# Patient Record
Sex: MALE | Race: WHITE | NOT HISPANIC OR LATINO
[De-identification: names, ages, dates, MRNs, and addresses within clinical notes are randomized per-mention and may not be internally consistent; named-entity substitution may affect disease eponyms.]

---

## 2019-03-05 ENCOUNTER — RECORD ABSTRACTING (OUTPATIENT)
Age: 44
End: 2019-03-05

## 2019-03-05 DIAGNOSIS — M71.10 OTHER INFECTIVE BURSITIS, UNSPECIFIED SITE: ICD-10-CM

## 2019-03-05 DIAGNOSIS — J30.2 OTHER SEASONAL ALLERGIC RHINITIS: ICD-10-CM

## 2019-11-18 ENCOUNTER — APPOINTMENT (OUTPATIENT)
Dept: INTERNAL MEDICINE | Facility: CLINIC | Age: 44
End: 2019-11-18
Payer: COMMERCIAL

## 2019-11-18 VITALS
OXYGEN SATURATION: 99 % | HEART RATE: 82 BPM | BODY MASS INDEX: 24.75 KG/M2 | SYSTOLIC BLOOD PRESSURE: 108 MMHG | TEMPERATURE: 99.6 F | HEIGHT: 69 IN | DIASTOLIC BLOOD PRESSURE: 84 MMHG | WEIGHT: 167.13 LBS | RESPIRATION RATE: 16 BRPM

## 2019-11-18 DIAGNOSIS — Z82.62 FAMILY HISTORY OF OSTEOPOROSIS: ICD-10-CM

## 2019-11-18 DIAGNOSIS — Z83.3 FAMILY HISTORY OF DIABETES MELLITUS: ICD-10-CM

## 2019-11-18 DIAGNOSIS — Z78.9 OTHER SPECIFIED HEALTH STATUS: ICD-10-CM

## 2019-11-18 PROCEDURE — 99213 OFFICE O/P EST LOW 20 MIN: CPT

## 2019-11-18 RX ORDER — VITAMIN K2 90 MCG
125 MCG CAPSULE ORAL
Refills: 0 | Status: ACTIVE | COMMUNITY

## 2019-11-18 RX ORDER — MULTIVITAMIN
TABLET ORAL
Refills: 0 | Status: ACTIVE | COMMUNITY

## 2019-11-18 RX ORDER — ASCORBIC ACID 500 MG
500 TABLET ORAL DAILY
Refills: 0 | Status: ACTIVE | COMMUNITY

## 2019-11-18 RX ORDER — BIOTIN 10 MG
TABLET ORAL DAILY
Refills: 0 | Status: ACTIVE | COMMUNITY

## 2019-11-19 NOTE — PHYSICAL EXAM
[No Acute Distress] : no acute distress [Well Nourished] : well nourished [Well Developed] : well developed [Well-Appearing] : well-appearing [Normal Sclera/Conjunctiva] : normal sclera/conjunctiva [PERRL] : pupils equal round and reactive to light [Normal Outer Ear/Nose] : the outer ears and nose were normal in appearance [EOMI] : extraocular movements intact [Normal Oropharynx] : the oropharynx was normal [No JVD] : no jugular venous distention [Normal TMs] : both tympanic membranes were normal [No Lymphadenopathy] : no lymphadenopathy [Thyroid Normal, No Nodules] : the thyroid was normal and there were no nodules present [No Respiratory Distress] : no respiratory distress  [Supple] : supple [Normal Rate] : normal rate  [No Accessory Muscle Use] : no accessory muscle use [Clear to Auscultation] : lungs were clear to auscultation bilaterally [Regular Rhythm] : with a regular rhythm [No Murmur] : no murmur heard [Normal S1, S2] : normal S1 and S2 [No Abdominal Bruit] : a ~M bruit was not heard ~T in the abdomen [No Carotid Bruits] : no carotid bruits [No Palpable Aorta] : no palpable aorta [No Edema] : there was no peripheral edema [Pedal Pulses Present] : the pedal pulses are present [Non Tender] : non-tender [No Extremity Clubbing/Cyanosis] : no extremity clubbing/cyanosis [Soft] : abdomen soft [Non-distended] : non-distended [No Masses] : no abdominal mass palpated [No HSM] : no HSM [Normal Posterior Cervical Nodes] : no posterior cervical lymphadenopathy [Normal Anterior Cervical Nodes] : no anterior cervical lymphadenopathy [Normal Bowel Sounds] : normal bowel sounds [No Spinal Tenderness] : no spinal tenderness [No Joint Swelling] : no joint swelling [No CVA Tenderness] : no CVA  tenderness [Coordination Grossly Intact] : coordination grossly intact [Grossly Normal Strength/Tone] : grossly normal strength/tone [No Rash] : no rash [No Focal Deficits] : no focal deficits [Normal Gait] : normal gait [Speech Grossly Normal] : speech grossly normal [Alert and Oriented x3] : oriented to person, place, and time [Deep Tendon Reflexes (DTR)] : deep tendon reflexes were 2+ and symmetric [Normal Affect] : the affect was normal [Normal Insight/Judgement] : insight and judgment were intact [Normal Mood] : the mood was normal [de-identified] : positive tinnel sign left wrist    [de-identified] : small bruise left forearm

## 2019-11-19 NOTE — HEALTH RISK ASSESSMENT
[Yes] : Yes [3 or 4 (1 pt)] : 3 or 4  (1 point) [Monthly or less (1 pt)] : Monthly or less (1 point) [Less than monthly (1 pt)] : Less than monthly (1 point) [No] : In the past 12 months have you used drugs other than those required for medical reasons? No [0] : 1) Little interest or pleasure doing things: Not at all (0) [No falls in past year] : Patient reported no falls in the past year [] : No [Audit-CScore] : 3 [[id number]] : 0

## 2019-11-19 NOTE — PLAN
[FreeTextEntry1] : Orthopedic  - CTS  - Advised cock up wrist brace to wear at night. Start b-complex.  If no improvement will refer to orthopedic for evaluation of CTS and EMG/NCV.  Counseling given to the pt\par \par Right forearm bruise advised supportive measure.\par \par Vitamin -D  Advised to take vitamin D3\par \par Immunization PT refused flu shot.  Education given

## 2019-11-19 NOTE — HISTORY OF PRESENT ILLNESS
[de-identified] :  A   44 year old male with a medical history as noted below present to the office for evaluation of hand numbness.  States he first noticed a few months ago but recently it has happen more often.  Patient states he wakes up in the am with mostly his left hand being numb.  Has had episode in the right hand as well.  Denies trauma to the area.  The symptoms are not present every morning.  Denies noticing any weakness of the extremity.  States symptoms are relived with re adjusting his hand position.  \par \par Also has notice a yellow discoloration on the left forearm.  Denies trauma to the area. Not sure if it is related to the numbness  [FreeTextEntry1] : Hand numbness and evaluation of a bruise

## 2019-11-19 NOTE — COUNSELING
[AUDIT-C Screening administered and reviewed] : AUDIT-C Screening administered and reviewed [Encouraged to increase physical activity] : Encouraged to increase physical activity [Good understanding] : Patient has a good understanding of disease, goals and obesity follow-up plan

## 2019-12-18 ENCOUNTER — APPOINTMENT (OUTPATIENT)
Dept: INTERNAL MEDICINE | Facility: CLINIC | Age: 44
End: 2019-12-18
Payer: COMMERCIAL

## 2019-12-18 ENCOUNTER — NON-APPOINTMENT (OUTPATIENT)
Age: 44
End: 2019-12-18

## 2019-12-18 VITALS
BODY MASS INDEX: 24.75 KG/M2 | SYSTOLIC BLOOD PRESSURE: 110 MMHG | WEIGHT: 167.13 LBS | RESPIRATION RATE: 16 BRPM | DIASTOLIC BLOOD PRESSURE: 80 MMHG | OXYGEN SATURATION: 99 % | HEIGHT: 69 IN | TEMPERATURE: 98.9 F | HEART RATE: 71 BPM

## 2019-12-18 DIAGNOSIS — B07.0 PLANTAR WART: ICD-10-CM

## 2019-12-18 DIAGNOSIS — M54.16 RADICULOPATHY, LUMBAR REGION: ICD-10-CM

## 2019-12-18 PROCEDURE — 99396 PREV VISIT EST AGE 40-64: CPT | Mod: 25

## 2019-12-18 PROCEDURE — 93000 ELECTROCARDIOGRAM COMPLETE: CPT

## 2019-12-18 PROCEDURE — 36415 COLL VENOUS BLD VENIPUNCTURE: CPT

## 2019-12-19 PROBLEM — M54.16 RIGHT LUMBAR RADICULOPATHY: Status: ACTIVE | Noted: 2019-12-19

## 2019-12-19 PROBLEM — B07.0 PLANTAR WART OF RIGHT FOOT: Status: ACTIVE | Noted: 2019-12-19

## 2019-12-19 NOTE — HISTORY OF PRESENT ILLNESS
[FreeTextEntry1] : annual physical exam\par  [de-identified] : Patient is a 44 year old male with a past medical history as below who presents for annual physical exam. Patient is not currently taking any prescription medications, but is taking several vitamins/supplements (Biotin, vitamin C, vitamin D, and a multivitamin). Patient states left hand numbness has resolved after using a wrist brace. Patient had previously noted lower back pain that radiated down a portion of his gluteal region, but states the pain did not radiate down the lower extremities. He states he had been seeing a chiropractor who recommended imaging. Patient notes his inner ears occasionally feel itchy. He notes a right foot pimple. Patient states he has not been maintaining a well-balanced diet, but notes exercising regularly (sees  1x per week, attends gym at least 1x per week). Patient is not interested in receiving the flu vaccine today. He received the Tetanus vaccine last year.

## 2019-12-19 NOTE — PHYSICAL EXAM
[Well Nourished] : well nourished [No Acute Distress] : no acute distress [Well Developed] : well developed [Normal Sclera/Conjunctiva] : normal sclera/conjunctiva [Well-Appearing] : well-appearing [PERRL] : pupils equal round and reactive to light [EOMI] : extraocular movements intact [No JVD] : no jugular venous distention [Normal Oropharynx] : the oropharynx was normal [Normal Outer Ear/Nose] : the outer ears and nose were normal in appearance [Supple] : supple [No Lymphadenopathy] : no lymphadenopathy [Thyroid Normal, No Nodules] : the thyroid was normal and there were no nodules present [No Respiratory Distress] : no respiratory distress  [No Accessory Muscle Use] : no accessory muscle use [Regular Rhythm] : with a regular rhythm [Clear to Auscultation] : lungs were clear to auscultation bilaterally [Normal Rate] : normal rate  [Normal S1, S2] : normal S1 and S2 [No Murmur] : no murmur heard [No Abdominal Bruit] : a ~M bruit was not heard ~T in the abdomen [No Carotid Bruits] : no carotid bruits [No Varicosities] : no varicosities [No Edema] : there was no peripheral edema [Pedal Pulses Present] : the pedal pulses are present [Soft] : abdomen soft [No Palpable Aorta] : no palpable aorta [No Extremity Clubbing/Cyanosis] : no extremity clubbing/cyanosis [Non-distended] : non-distended [Non Tender] : non-tender [Normal Bowel Sounds] : normal bowel sounds [No Masses] : no abdominal mass palpated [No HSM] : no HSM [Normal Posterior Cervical Nodes] : no posterior cervical lymphadenopathy [Normal Anterior Cervical Nodes] : no anterior cervical lymphadenopathy [No Spinal Tenderness] : no spinal tenderness [No Joint Swelling] : no joint swelling [No CVA Tenderness] : no CVA  tenderness [Grossly Normal Strength/Tone] : grossly normal strength/tone [No Rash] : no rash [Coordination Grossly Intact] : coordination grossly intact [Deep Tendon Reflexes (DTR)] : deep tendon reflexes were 2+ and symmetric [Normal Gait] : normal gait [No Focal Deficits] : no focal deficits [Normal Insight/Judgement] : insight and judgment were intact [Normal Affect] : the affect was normal [de-identified] : 3mm by 3mm papule between 1st and 2nd digit of right foot

## 2019-12-19 NOTE — ASSESSMENT
[FreeTextEntry1] : Patient is a 44 year old male with a past medical history as above who presents for annual physical exam.\par

## 2019-12-19 NOTE — REVIEW OF SYSTEMS
[Negative] : Heme/Lymph [FreeTextEntry4] : intermittent inner ear itch  [de-identified] : right foot pimple

## 2019-12-19 NOTE — HEALTH RISK ASSESSMENT
[Yes] : Yes [Monthly or less (1 pt)] : Monthly or less (1 point) [3 or 4 (1 pt)] : 3 or 4  (1 point) [Less than monthly (1 pt)] : Less than monthly (1 point) [No] : In the past 12 months have you used drugs other than those required for medical reasons? No [0] : 2) Feeling down, depressed, or hopeless: Not at all (0) [] : No [[id number]] : 0 [Audit-CScore] : 3

## 2019-12-19 NOTE — PLAN
[FreeTextEntry1] : ENT\par intermittent inner ear itch - recommended applying thin film of OTC Hydrocortisone cream p.r.n. \par Endocrinology\par vitamin D deficiency - continue vitamin D-3 5000 unit capsules p.o.q.d. with meals - check vitamin D\par Musculoskeletal\par back pain - advised against MRI of lumbar spine at the present time as patient is currently asymptomatic; advised to follow up if back pain reoccurs for possible imaging  \par Dermatology/Podiatry\par plantar wart on right foot - referred to podiatrist, Dr. Edwards for further evaluation/treatment  \par Infectious Disease\par flu vaccine - discussed, refused\par \par check EKG (results as above), male panel, and UA

## 2019-12-21 LAB
25(OH)D3 SERPL-MCNC: 77.3 NG/ML
ALBUMIN SERPL ELPH-MCNC: 4.6 G/DL
ALP BLD-CCNC: 65 U/L
ALT SERPL-CCNC: 5 U/L
ANION GAP SERPL CALC-SCNC: 12 MMOL/L
APPEARANCE: CLEAR
AST SERPL-CCNC: 21 U/L
BASOPHILS # BLD AUTO: 0.04 K/UL
BASOPHILS NFR BLD AUTO: 0.9 %
BILIRUB SERPL-MCNC: 0.8 MG/DL
BILIRUBIN URINE: NEGATIVE
BLOOD URINE: NEGATIVE
BUN SERPL-MCNC: 20 MG/DL
CALCIUM SERPL-MCNC: 10 MG/DL
CHLORIDE SERPL-SCNC: 102 MMOL/L
CHOLEST SERPL-MCNC: 223 MG/DL
CHOLEST/HDLC SERPL: 3.9 RATIO
CO2 SERPL-SCNC: 29 MMOL/L
COLOR: YELLOW
CREAT SERPL-MCNC: 1.2 MG/DL
EOSINOPHIL # BLD AUTO: 0.1 K/UL
EOSINOPHIL NFR BLD AUTO: 2.3 %
ESTIMATED AVERAGE GLUCOSE: 97 MG/DL
GLUCOSE QUALITATIVE U: NEGATIVE
GLUCOSE SERPL-MCNC: 84 MG/DL
HBA1C MFR BLD HPLC: 5 %
HCT VFR BLD CALC: 48.6 %
HDLC SERPL-MCNC: 57 MG/DL
HGB BLD-MCNC: 15.9 G/DL
IMM GRANULOCYTES NFR BLD AUTO: 0.2 %
KETONES URINE: NEGATIVE
LDLC SERPL CALC-MCNC: 147 MG/DL
LEUKOCYTE ESTERASE URINE: NEGATIVE
LYMPHOCYTES # BLD AUTO: 1.21 K/UL
LYMPHOCYTES NFR BLD AUTO: 27.9 %
MAN DIFF?: NORMAL
MCHC RBC-ENTMCNC: 31.7 PG
MCHC RBC-ENTMCNC: 32.7 GM/DL
MCV RBC AUTO: 96.8 FL
MONOCYTES # BLD AUTO: 0.41 K/UL
MONOCYTES NFR BLD AUTO: 9.4 %
NEUTROPHILS # BLD AUTO: 2.57 K/UL
NEUTROPHILS NFR BLD AUTO: 59.3 %
NITRITE URINE: NEGATIVE
PH URINE: 6
PLATELET # BLD AUTO: 196 K/UL
POTASSIUM SERPL-SCNC: 4.1 MMOL/L
PROT SERPL-MCNC: 6.8 G/DL
PROTEIN URINE: NORMAL
PSA SERPL-MCNC: 0.86 NG/ML
RBC # BLD: 5.02 M/UL
RBC # FLD: 12.2 %
SODIUM SERPL-SCNC: 143 MMOL/L
SPECIFIC GRAVITY URINE: 1.03
TRIGL SERPL-MCNC: 97 MG/DL
TSH SERPL-ACNC: 1.11 UIU/ML
UROBILINOGEN URINE: NORMAL
WBC # FLD AUTO: 4.34 K/UL

## 2020-12-18 ENCOUNTER — APPOINTMENT (OUTPATIENT)
Dept: INTERNAL MEDICINE | Facility: CLINIC | Age: 45
End: 2020-12-18

## 2021-04-28 ENCOUNTER — APPOINTMENT (OUTPATIENT)
Dept: INTERNAL MEDICINE | Facility: CLINIC | Age: 46
End: 2021-04-28
Payer: COMMERCIAL

## 2021-04-28 VITALS
RESPIRATION RATE: 16 BRPM | HEIGHT: 69 IN | SYSTOLIC BLOOD PRESSURE: 120 MMHG | HEART RATE: 96 BPM | DIASTOLIC BLOOD PRESSURE: 78 MMHG | TEMPERATURE: 98.1 F | OXYGEN SATURATION: 99 % | BODY MASS INDEX: 25.03 KG/M2 | WEIGHT: 169 LBS

## 2021-04-28 DIAGNOSIS — R25.3 FASCICULATION: ICD-10-CM

## 2021-04-28 DIAGNOSIS — Z86.59 PERSONAL HISTORY OF OTHER MENTAL AND BEHAVIORAL DISORDERS: ICD-10-CM

## 2021-04-28 PROCEDURE — 99213 OFFICE O/P EST LOW 20 MIN: CPT | Mod: 25

## 2021-04-28 PROCEDURE — G0442 ANNUAL ALCOHOL SCREEN 15 MIN: CPT

## 2021-04-28 PROCEDURE — 99072 ADDL SUPL MATRL&STAF TM PHE: CPT

## 2021-04-28 PROCEDURE — 36415 COLL VENOUS BLD VENIPUNCTURE: CPT

## 2021-04-28 NOTE — HEALTH RISK ASSESSMENT
[Yes] : Yes [2 - 4 times a month (2 pts)] : 2-4 times a month (2 points) [1 or 2 (0 pts)] : 1 or 2 (0 points) [Never (0 pts)] : Never (0 points) [No] : In the past 12 months have you used drugs other than those required for medical reasons? No [0] : 2) Feeling down, depressed, or hopeless: Not at all (0) [] : No [Audit-CScore] : 2 [[id number]] : 0

## 2021-04-28 NOTE — ASSESSMENT
[FreeTextEntry1] : Patient is a 46 year old male with a past medical history as above who presents for muscle fasciculations (left eyelid).

## 2021-04-28 NOTE — REVIEW OF SYSTEMS
[Negative] : Heme/Lymph [FreeTextEntry3] : involuntary twitching of left eyelid [de-identified] : increased stress; sleeping less

## 2021-04-28 NOTE — HISTORY OF PRESENT ILLNESS
[FreeTextEntry8] : Patient is a 46 year old male with a past medical history as below who presents for intermittent, involuntary twitching of the left eyelid that started approximately 2.5 weeks ago. He last observed the twitching 3 days ago. He notes less sleep secondary to working longer hours. He notes increased stress secondary to work. He denies frequent alcohol consumption. He states he has not been exercising regularly over the course of the past year. Patient has not received the COVID-19 Vaccine.

## 2021-04-28 NOTE — ADDENDUM
[FreeTextEntry1] : I, Te Roberson, acted solely as scribe for Dr. Nir Freeman DO on this date 04/28/2021 12:30PM .\par \par All medical record entries made by the Scribe were at my, Dr. Nir Freeman DO direction and personally dictated by me on 04/28/2021 12:30PM. I have reviewed the chart and agree that the record accurately reflects my personal performance of the history, physical exam, assessment and plan. I have also personally directed, reviewed and agreed with the chart.\par

## 2021-04-28 NOTE — PLAN
[FreeTextEntry1] : Ophthalmology/Neurology\par muscle fasciculations (left eyelid) - check CBC, CMP, and TSH - possibly secondary to sleep deprivation: discussed the importance of sleeping for 6-7 hours per night - discussed referral to neurologist for further evaluation if blood work does not reveal any abnormalities and the fasciculations persist \par Endocrinology\par vitamin D deficiency - continue Vitamin D-3 5000 IU p.o.q.d. with meals as directed \par Psychiatry\par increased stress - discussed referral to therapist, declined - recommended increasing CV exercise\par \par check CBC, CMP, and TSH

## 2021-05-02 ENCOUNTER — NON-APPOINTMENT (OUTPATIENT)
Age: 46
End: 2021-05-02

## 2021-05-02 LAB
ALBUMIN SERPL ELPH-MCNC: 4.6 G/DL
ALP BLD-CCNC: 73 U/L
ALT SERPL-CCNC: 6 U/L
ANION GAP SERPL CALC-SCNC: 12 MMOL/L
AST SERPL-CCNC: 17 U/L
BASOPHILS # BLD AUTO: 0.03 K/UL
BASOPHILS NFR BLD AUTO: 0.8 %
BILIRUB SERPL-MCNC: 0.5 MG/DL
BUN SERPL-MCNC: 20 MG/DL
CALCIUM SERPL-MCNC: 10.3 MG/DL
CHLORIDE SERPL-SCNC: 101 MMOL/L
CO2 SERPL-SCNC: 29 MMOL/L
CREAT SERPL-MCNC: 1.17 MG/DL
EOSINOPHIL # BLD AUTO: 0.06 K/UL
EOSINOPHIL NFR BLD AUTO: 1.6 %
GLUCOSE SERPL-MCNC: 86 MG/DL
HCT VFR BLD CALC: 47.3 %
HGB BLD-MCNC: 15.5 G/DL
IMM GRANULOCYTES NFR BLD AUTO: 0.3 %
LYMPHOCYTES # BLD AUTO: 0.96 K/UL
LYMPHOCYTES NFR BLD AUTO: 26 %
MAN DIFF?: NORMAL
MCHC RBC-ENTMCNC: 32.2 PG
MCHC RBC-ENTMCNC: 32.8 GM/DL
MCV RBC AUTO: 98.1 FL
MONOCYTES # BLD AUTO: 0.31 K/UL
MONOCYTES NFR BLD AUTO: 8.4 %
NEUTROPHILS # BLD AUTO: 2.32 K/UL
NEUTROPHILS NFR BLD AUTO: 62.9 %
PLATELET # BLD AUTO: 191 K/UL
POTASSIUM SERPL-SCNC: 4.2 MMOL/L
PROT SERPL-MCNC: 6.7 G/DL
RBC # BLD: 4.82 M/UL
RBC # FLD: 12.3 %
SODIUM SERPL-SCNC: 143 MMOL/L
TSH SERPL-ACNC: 1.48 UIU/ML
WBC # FLD AUTO: 3.69 K/UL

## 2021-07-26 ENCOUNTER — NON-APPOINTMENT (OUTPATIENT)
Age: 46
End: 2021-07-26

## 2021-07-26 ENCOUNTER — APPOINTMENT (OUTPATIENT)
Dept: INTERNAL MEDICINE | Facility: CLINIC | Age: 46
End: 2021-07-26
Payer: COMMERCIAL

## 2021-07-26 VITALS
DIASTOLIC BLOOD PRESSURE: 82 MMHG | TEMPERATURE: 97.7 F | RESPIRATION RATE: 16 BRPM | SYSTOLIC BLOOD PRESSURE: 128 MMHG | OXYGEN SATURATION: 98 % | HEIGHT: 69 IN | BODY MASS INDEX: 24.2 KG/M2 | HEART RATE: 79 BPM | WEIGHT: 163.4 LBS

## 2021-07-26 DIAGNOSIS — Z12.11 ENCOUNTER FOR SCREENING FOR MALIGNANT NEOPLASM OF COLON: ICD-10-CM

## 2021-07-26 DIAGNOSIS — Z00.00 ENCOUNTER FOR GENERAL ADULT MEDICAL EXAMINATION W/OUT ABNORMAL FINDINGS: ICD-10-CM

## 2021-07-26 PROCEDURE — 93000 ELECTROCARDIOGRAM COMPLETE: CPT

## 2021-07-26 PROCEDURE — 36415 COLL VENOUS BLD VENIPUNCTURE: CPT

## 2021-07-26 PROCEDURE — 99072 ADDL SUPL MATRL&STAF TM PHE: CPT

## 2021-07-26 PROCEDURE — 99396 PREV VISIT EST AGE 40-64: CPT | Mod: 25

## 2021-07-26 NOTE — ADDENDUM
[FreeTextEntry1] : I, Te Roberson, acted solely as scribe for Dr. Nir Freeman DO on this date 07/26/2021  1:50PM .\par \par All medical record entries made by the Scribe were at my, Dr. Nir Freeman DO direction and personally dictated by me on 07/26/2021  1:50PM. I have reviewed the chart and agree that the record accurately reflects my personal performance of the history, physical exam, assessment and plan. I have also personally directed, reviewed and agreed with the chart.\par

## 2021-07-26 NOTE — ASSESSMENT
[FreeTextEntry1] : Patient is a 46 year old male with a past medical history as above who presents for an annual wellness visit.\par

## 2021-07-26 NOTE — HEALTH RISK ASSESSMENT
[Yes] : Yes [2 - 4 times a month (2 pts)] : 2-4 times a month (2 points) [1 or 2 (0 pts)] : 1 or 2 (0 points) [Never (0 pts)] : Never (0 points) [No] : In the past 12 months have you used drugs other than those required for medical reasons? No [0] : 2) Feeling down, depressed, or hopeless: Not at all (0) [PHQ-2 Negative - No further assessment needed] : PHQ-2 Negative - No further assessment needed [] : No [Audit-CScore] : 2 [de-identified] : Gym 1x per week. [[id number]] : 0 [ColonoscopyComments] : Referred to gastroenterologist, Dr. Chacko for consultation.

## 2021-07-26 NOTE — HISTORY OF PRESENT ILLNESS
[FreeTextEntry1] : annual wellness visit [de-identified] : Patient is a 46 year old male with a past medical history as below who presents for an annual wellness visit. Patient is not currently taking any prescription medications. He is taking several vitamins/supplements (Biotin, Vitamin C, Vitamin D-3, and Multivitamin). Patient has never had a screening colonoscopy. Patient states fasciculations of the left eyelid have resolved. He had followed up with an ophthalmologist who believed the fasciculations were secondary to increased stress. Patient attributes weight loss since his last visit to decreasing carbohydrates/sugars in his diet and restarting CV exercise (Gym 1x per week). Patient did not receive the flu vaccine for this past flu season. He received the Tdap Vaccine on 10/15/18. He has not received the COVID-19 Vaccine.

## 2021-07-26 NOTE — PLAN
[FreeTextEntry1] : Cardiology\par history of borderline-high cholesterol - continue low cholesterol/low fat diet - check FLP \par Endocrinology\par Vitamin D deficiency - continue Vitamin D-3 5000 IU p.o.q.d. with meals as directed - check Vitamin D \par Gastroenterology\par screening colonoscopy - referred to gastroenterologist, Dr. Chacko for consultation \par Hematology\par leukopenia - recheck CBC\par \par check EKG (results as above), male panel, and hemoglobin A1C

## 2021-07-28 LAB
25(OH)D3 SERPL-MCNC: 80.1 NG/ML
ALBUMIN SERPL ELPH-MCNC: 4.5 G/DL
ALP BLD-CCNC: 72 U/L
ALT SERPL-CCNC: 11 U/L
ANION GAP SERPL CALC-SCNC: 14 MMOL/L
AST SERPL-CCNC: 52 U/L
BASOPHILS # BLD AUTO: 0.03 K/UL
BASOPHILS NFR BLD AUTO: 0.7 %
BILIRUB SERPL-MCNC: 0.7 MG/DL
BUN SERPL-MCNC: 16 MG/DL
CALCIUM SERPL-MCNC: 9.9 MG/DL
CHLORIDE SERPL-SCNC: 100 MMOL/L
CHOLEST SERPL-MCNC: 213 MG/DL
CO2 SERPL-SCNC: 26 MMOL/L
CREAT SERPL-MCNC: 1.07 MG/DL
EOSINOPHIL # BLD AUTO: 0.05 K/UL
EOSINOPHIL NFR BLD AUTO: 1.2 %
ESTIMATED AVERAGE GLUCOSE: 97 MG/DL
GLUCOSE SERPL-MCNC: 76 MG/DL
HBA1C MFR BLD HPLC: 5 %
HCT VFR BLD CALC: 47 %
HDLC SERPL-MCNC: 57 MG/DL
HGB BLD-MCNC: 15.3 G/DL
IMM GRANULOCYTES NFR BLD AUTO: 0.2 %
LDLC SERPL CALC-MCNC: 139 MG/DL
LYMPHOCYTES # BLD AUTO: 1.06 K/UL
LYMPHOCYTES NFR BLD AUTO: 25 %
MAN DIFF?: NORMAL
MCHC RBC-ENTMCNC: 31.5 PG
MCHC RBC-ENTMCNC: 32.6 GM/DL
MCV RBC AUTO: 96.9 FL
MONOCYTES # BLD AUTO: 0.4 K/UL
MONOCYTES NFR BLD AUTO: 9.4 %
NEUTROPHILS # BLD AUTO: 2.69 K/UL
NEUTROPHILS NFR BLD AUTO: 63.5 %
NONHDLC SERPL-MCNC: 156 MG/DL
PLATELET # BLD AUTO: 178 K/UL
POTASSIUM SERPL-SCNC: 4.4 MMOL/L
PROT SERPL-MCNC: 6.9 G/DL
PSA SERPL-MCNC: 0.81 NG/ML
RBC # BLD: 4.85 M/UL
RBC # FLD: 12.2 %
SODIUM SERPL-SCNC: 139 MMOL/L
TRIGL SERPL-MCNC: 88 MG/DL
TSH SERPL-ACNC: 1.66 UIU/ML
WBC # FLD AUTO: 4.24 K/UL

## 2021-07-29 ENCOUNTER — NON-APPOINTMENT (OUTPATIENT)
Age: 46
End: 2021-07-29

## 2021-08-26 ENCOUNTER — APPOINTMENT (OUTPATIENT)
Dept: INTERNAL MEDICINE | Facility: CLINIC | Age: 46
End: 2021-08-26
Payer: COMMERCIAL

## 2021-08-26 ENCOUNTER — LABORATORY RESULT (OUTPATIENT)
Age: 46
End: 2021-08-26

## 2021-08-26 VITALS
WEIGHT: 160.8 LBS | OXYGEN SATURATION: 98 % | DIASTOLIC BLOOD PRESSURE: 80 MMHG | RESPIRATION RATE: 16 BRPM | TEMPERATURE: 98.2 F | HEART RATE: 75 BPM | HEIGHT: 69 IN | SYSTOLIC BLOOD PRESSURE: 110 MMHG | BODY MASS INDEX: 23.82 KG/M2

## 2021-08-26 DIAGNOSIS — R74.8 ABNORMAL LEVELS OF OTHER SERUM ENZYMES: ICD-10-CM

## 2021-08-26 DIAGNOSIS — E55.9 VITAMIN D DEFICIENCY, UNSPECIFIED: ICD-10-CM

## 2021-08-26 PROCEDURE — 36415 COLL VENOUS BLD VENIPUNCTURE: CPT

## 2021-08-26 PROCEDURE — 99213 OFFICE O/P EST LOW 20 MIN: CPT | Mod: 25

## 2021-08-26 NOTE — HEALTH RISK ASSESSMENT
[Yes] : Yes [2 - 4 times a month (2 pts)] : 2-4 times a month (2 points) [1 or 2 (0 pts)] : 1 or 2 (0 points) [Never (0 pts)] : Never (0 points) [No] : In the past 12 months have you used drugs other than those required for medical reasons? No [0] : 2) Feeling down, depressed, or hopeless: Not at all (0) [PHQ-2 Negative - No further assessment needed] : PHQ-2 Negative - No further assessment needed [] : No [Audit-CScore] : 2 [de-identified] : Gym 1x per week. [[id number]] : 0 [ColonoscopyComments] : Previously referred to gastroenterologist, Dr. Chacko for consultation.  C/w Librium samuel Patient seen and examined with team  Agree with A/p by Dr Marsh  57 year old man with PMH of AUD c/b withdrawal, major depression, HTN, HLD, asthma, GERD who presents to the hospital with acute alcohol intoxication and suicidal ideation admitted for alcohol withdrawal.  C/w Librium samuel  f/u Psych

## 2021-08-26 NOTE — HISTORY OF PRESENT ILLNESS
[FreeTextEntry1] : non-fasting blood work and general follow-up [de-identified] : Patient is a 46 year old male with a past medical history as below who presents for non-fasting blood work and general follow-up. Blood work done on 7/26/21 revealed elevated AST (42, possibly secondary to alcohol consumption), elevated LDL (139), elevated non-HDL cholesterol (156), and high Vitamin D (80.1). A low cholesterol/low fat diet was previously advised. Patient has been taking Vitamin D 5000 IU 5x per week as directed.

## 2021-08-26 NOTE — ADDENDUM
[FreeTextEntry1] : I, Te Roberson, acted solely as scribe for Dr. Nir Freeman DO on this date 08/26/2021  1:00PM .\par \par All medical record entries made by the Scribe were at my, Dr. Nir Freeman DO direction and personally dictated by me on 08/26/2021  1:00PM. I have reviewed the chart and agree that the record accurately reflects my personal performance of the history, physical exam, assessment and plan. I have also personally directed, reviewed and agreed with the chart.\par

## 2021-08-26 NOTE — PLAN
[FreeTextEntry1] : Gastroenterology\par elevated liver enzymes - check liver panel; recommended Abdominal US pending results of blood work  \par screening colonoscopy - previously referred to gastroenterologist, Dr. Chacko for consultation \par Cardiology\par history of borderline-high cholesterol - continue low cholesterol/low fat diet \par Endocrinology\par high Vitamin D level - continue Vitamin D-3 5000 IU p.o. with meals 5 days per week as directed - check Vitamin D\par \par check liver panel and Vitamin D

## 2021-08-26 NOTE — ASSESSMENT
[FreeTextEntry1] : Patient is a 46 year old male with a past medical history as above who presents for non-fasting blood work and general follow-up.

## 2021-08-29 LAB
25(OH)D3 SERPL-MCNC: 99.7 NG/ML
ALT SERPL-CCNC: 9 U/L
ANA SER IF-ACNC: NEGATIVE
AST SERPL-CCNC: 24 U/L
CERULOPLASMIN SERPL-MCNC: 20 MG/DL
HBV SURFACE AB SER QL: NONREACTIVE
HCV AB SER QL: NONREACTIVE
HCV S/CO RATIO: 0.08 S/CO
HEPATITIS A IGG ANTIBODY: NONREACTIVE
IRON SATN MFR SERPL: 45 %
IRON SERPL-MCNC: 144 UG/DL
TIBC SERPL-MCNC: 321 UG/DL
UIBC SERPL-MCNC: 177 UG/DL

## 2021-08-30 LAB
A1AT PHENOTYP SERPL-IMP: NORMAL
A1AT SERPL-MCNC: 118 MG/DL
MITOCHONDRIA AB SER IF-ACNC: NORMAL
SMOOTH MUSCLE AB SER QL IF: NORMAL

## 2021-09-03 LAB
CELIAC DISEASE INTERPRETATION: NORMAL
CELIAC GENE PAIRS PRESENT: YES
DQ ALPHA 1: NORMAL
DQ BETA 1: NORMAL
IMMUNOGLOBULIN A (IGA): 241 MG/DL

## 2024-07-18 ENCOUNTER — APPOINTMENT (OUTPATIENT)
Dept: INTERNAL MEDICINE | Facility: CLINIC | Age: 49
End: 2024-07-18
Payer: COMMERCIAL

## 2024-07-18 VITALS
HEART RATE: 81 BPM | RESPIRATION RATE: 16 BRPM | WEIGHT: 152 LBS | DIASTOLIC BLOOD PRESSURE: 76 MMHG | OXYGEN SATURATION: 98 % | BODY MASS INDEX: 22.51 KG/M2 | TEMPERATURE: 98.2 F | SYSTOLIC BLOOD PRESSURE: 118 MMHG | HEIGHT: 69 IN

## 2024-07-18 DIAGNOSIS — Z13.31 ENCOUNTER FOR SCREENING FOR DEPRESSION: ICD-10-CM

## 2024-07-18 DIAGNOSIS — B34.9 VIRAL INFECTION, UNSPECIFIED: ICD-10-CM

## 2024-07-18 PROCEDURE — 99214 OFFICE O/P EST MOD 30 MIN: CPT

## 2024-07-18 PROCEDURE — G0444 DEPRESSION SCREEN ANNUAL: CPT | Mod: 59
